# Patient Record
Sex: MALE | URBAN - METROPOLITAN AREA
[De-identification: names, ages, dates, MRNs, and addresses within clinical notes are randomized per-mention and may not be internally consistent; named-entity substitution may affect disease eponyms.]

---

## 2018-05-01 ENCOUNTER — TRANSFERRED RECORDS (OUTPATIENT)
Dept: HEALTH INFORMATION MANAGEMENT | Facility: CLINIC | Age: 38
End: 2018-05-01

## 2018-05-01 ENCOUNTER — OFFICE VISIT (OUTPATIENT)
Dept: FAMILY MEDICINE | Facility: OTHER | Age: 38
End: 2018-05-01

## 2018-05-01 VITALS
TEMPERATURE: 96.6 F | HEART RATE: 71 BPM | OXYGEN SATURATION: 100 % | SYSTOLIC BLOOD PRESSURE: 115 MMHG | DIASTOLIC BLOOD PRESSURE: 69 MMHG

## 2018-05-01 DIAGNOSIS — R10.9 FLANK PAIN: Primary | ICD-10-CM

## 2018-05-01 PROCEDURE — 99213 OFFICE O/P EST LOW 20 MIN: CPT | Performed by: FAMILY MEDICINE

## 2018-05-01 RX ORDER — KETOROLAC TROMETHAMINE 30 MG/ML
30 INJECTION, SOLUTION INTRAMUSCULAR; INTRAVENOUS ONCE
Qty: 1 ML | Refills: 0 | OUTPATIENT
Start: 2018-05-01 | End: 2018-05-01

## 2018-05-01 NOTE — PROGRESS NOTES
"Patient presents to the clinic today with severe pain in right flank area that radiates into the RLQ. Rates pain 10/10 this morning. Skin pale but dry.  Nausea and vomiting when he arrived in the exam room. Denies any fever, urinary symptoms or bowel changes in the last 24 hours. Has never had this pain before. Unable to find a comfortable position on the exam table, standing or sitting.   Patient's 10yr old daughter, Arabella is here with him. Patient's brother, Brodie Schulz will be coming to take daughter home.His number is [phone]    Kirstin Prince RN  Patient Care Supervisor  [facility]  Office:[phone]      30mg Toradol administered at 7:45am   Lucita Austin CMA          Provider note    This was asked by the nursing staff to evaluate this gentleman for acute onset of severe abdominal pain.  He walked into the clinic this morning with severe right flank pain.  He was in fact moaning of the pain that could be heard in stated that he woke up with the pain my office.  The pain is sharp that came and went in waves.  Never had this kind of pain before.  Reviewed the nursing notes above and agreed.  He vomited once in the exam room.  Denies of unusual food.  No chest pain or shortness of breath.  No problem urination.  Denies of diarrhea constipation and denied of melena or hematochezia.  No recent histories of trauma.  He has chronic low back pain which is stable and controlled.  Denies of fever or chills.  No histories of kidney stone.  No history of abdominal surgery.    Reviewed the medical record - medication, allergies, past medical history, past surgical history and allergy.    Vital signs:  Temp: 96.6  F (35.9  C) Temp src: Temporal BP: 115/69 Pulse: 71     SpO2: 100 %          Estimated body mass index is 20.78 kg/(m^2) as calculated from the following:    Height as of 3/21/08: 5' 11\" (1.803 m).    Weight as of 3/21/08: 149 lb (67.6 kg).        Restless, very " uncomfortable on exam table, standing or sitting.  Severe pain seems to come in waves.   He looks clammy, sweating and pale looking  Lungs: clear  Heart:  RRR  Abd: soft,  Mild tender with palpation to the right flank and CVA.    Legs exam was normal    Clinical presentation and physical exam are suggestive of kidney stone, but need to further evaluated for other possible causes.  He was in quite a bit of pain; unable to get it control in the office today.  Recommended to be evaluated and managed in the ER.  He agreed.  EMS were called.  He was given a dose of 30 milligram Toradol IM.  Also apply ice to the flank which as expected did not make much of the difference.  He was taken to [facility] by ambulance stable condition.  Aliza Medina MD

## 2018-05-01 NOTE — MR AVS SNAPSHOT
"              After Visit Summary   2018    Carlos Schulz    MRN: [id number]           Patient Information     Date Of Birth          1980        Visit Information        Provider Department      2018 7:40 AM Aliza Medina MD [facility]        Today's Diagnoses     Flank pain    -  1       Follow-ups after your visit        Follow-up notes from your care team     Return if symptoms worsen or fail to improve.      Who to contact     If you have questions or need follow up information about today's clinic visit or your schedule please contact [facility] directly at [phone].  Normal or non-critical lab and imaging results will be communicated to you by abusixhart, letter or phone within 4 business days after the clinic has received the results. If you do not hear from us within 7 days, please contact the clinic through abusixhart or phone. If you have a critical or abnormal lab result, we will notify you by phone as soon as possible.  Submit refill requests through PhoneFusion or call your pharmacy and they will forward the refill request to us. Please allow 3 business days for your refill to be completed.          Additional Information About Your Visit        MyChart Information     PhoneFusion lets you send messages to your doctor, view your test results, renew your prescriptions, schedule appointments and more. To sign up, go to [email or web address] . Click on \"Log in\" on the left side of the screen, which will take you to the Welcome page. Then click on \"Sign up Now\" on the right side of the page.     You will be asked to enter the access code listed below, as well as some personal information. Please follow the directions to create your username and password.     Your access code is: [email or web address]  Expires: 2018  9:13 PM     Your access code will  in 90 days. If you need help or a new code, please call your [facility] or [phone].        Care EveryWhere " ID     This is your Care EveryWhere ID. This could be used by other organizations to access your [place] medical records  [id number]        Your Vitals Were     Pulse Temperature Pulse Oximetry             71 96.6  F (35.9  C) (Temporal) 100%          Blood Pressure from Last 3 Encounters:   05/01/18 115/69   03/21/08 115/60   02/04/08 107/67    Weight from Last 3 Encounters:   03/21/08 149 lb (67.6 kg)   02/04/08 151 lb (68.5 kg)   08/13/07 153 lb 8 oz (69.6 kg)              Today, you had the following     No orders found for display         Today's Medication Changes          These changes are accurate as of 5/1/18  9:13 PM.  If you have any questions, ask your nurse or doctor.               Start taking these medicines.        Dose/Directions    ketorolac 30 MG/ML injection   Commonly known as:  TORADOL   Used for:  Flank pain        Dose:  30 mg   Inject 1 mL (30 mg) into the muscle once for 1 dose   Quantity:  1 mL   Refills:  0            Where to get your medicines      Some of these will need a paper prescription and others can be bought over the counter.  Ask your nurse if you have questions.     You don't need a prescription for these medications     ketorolac 30 MG/ML injection                Primary Care Provider Fax #    Physician No Ref-Primary [phone]       No address on file        Equal Access to Services     PETE AREVALO : Anthony shrestha Sojen, waaxda luqadaha, qaybta kaalmada adelili, nighat zimmerman. So [facility] [phone].    ATENCIÓN: Si habla español, tiene a guerrero disposición servicios gratuitos de asistencia lingüística. Llame al [phone].    We comply with applicable federal civil rights laws and [place] laws. We do not discriminate on the basis of race, color, national origin, age, disability, sex, sexual orientation, or gender identity.            Thank you!     Thank you for choosing [facility]  for your care. Our goal is  always to provide you with excellent care. Hearing back from our patients is one way we can continue to improve our services. Please take a few minutes to complete the written survey that you may receive in the mail after your visit with us. Thank you!             Your Updated Medication List - Protect others around you: Learn how to safely use, store and throw away your medicines at www.disposemymeds.org.          This list is accurate as of 5/1/18  9:13 PM.  Always use your most recent med list.                   Brand Name Dispense Instructions for use Diagnosis    ADVIL 200 MG tablet   Generic drug:  ibuprofen      1 TABLET EVERY 4 TO 6 HOURS AS NEEDED        ciprofloxacin 500 MG tablet    CIPRO    60    1 TABLET TWICE DAILY for 2 months    Acute prostatitis       CLARAVIS 30 MG Caps   Generic drug:  ISOtretinoin      1 CAPSULE TWICE DAILY        FLOMAX 0.4 MG 24 hr capsule   Generic drug:  tamsulosin     30    1 TABLET DAILY AFTER A MEAL    Acute prostatitis       ketorolac 30 MG/ML injection    TORADOL    1 mL    Inject 1 mL (30 mg) into the muscle once for 1 dose    Flank pain       SUDOGEST 12 HOUR 120 MG 12 hr tablet   Generic drug:  pseudoePHEDrine      1 TABLET EVERY 12 HOURS AS NEEDED

## 2019-11-08 ENCOUNTER — HEALTH MAINTENANCE LETTER (OUTPATIENT)
Age: 39
End: 2019-11-08

## 2020-12-06 ENCOUNTER — HEALTH MAINTENANCE LETTER (OUTPATIENT)
Age: 40
End: 2020-12-06

## 2021-09-25 ENCOUNTER — HEALTH MAINTENANCE LETTER (OUTPATIENT)
Age: 41
End: 2021-09-25

## 2022-01-15 ENCOUNTER — HEALTH MAINTENANCE LETTER (OUTPATIENT)
Age: 42
End: 2022-01-15

## 2023-01-07 ENCOUNTER — HEALTH MAINTENANCE LETTER (OUTPATIENT)
Age: 43
End: 2023-01-07

## 2023-04-22 ENCOUNTER — HEALTH MAINTENANCE LETTER (OUTPATIENT)
Age: 43
End: 2023-04-22